# Patient Record
(demographics unavailable — no encounter records)

---

## 2025-04-24 NOTE — HISTORY OF PRESENT ILLNESS
[FreeTextEntry1] : NPA/CPE [de-identified] : The patient is a 46-year-old Belgian speaking female with PMH of GERD presents to office today to establish care. The patient reports she has been feeling very fatigued lately. She also finds she is very irritable usually around the start of her period. She follows regularly with GYN.   Last CPE: 1 year    GYN Exam: 1 year ago   Mammogram: Due in June   DEXA: n/a    Colonoscopy: 10 years ago, due now   Ophthalmology: UTD   Dermatology: never   Dentist: UTD   Shingles: n/a   Flu: Defer    Tdap: UTD    PNA:  Due now   COVID: x2  Diet: Regular     Exercise: None  Social Hx: No alcohol No drugs No smoking Born in [facility] Works part time at [employer]  Surgical hx C section x 3  No significant PFHX   no pets at home

## 2025-04-24 NOTE — HEALTH RISK ASSESSMENT
[No] : In the past 12 months have you used drugs other than those required for medical reasons? No [0] : 2) Feeling down, depressed, or hopeless: Not at all (0) [PHQ-2 Negative - No further assessment needed] : PHQ-2 Negative - No further assessment needed [Never] : Never [MammogramDate] : 2024 [PapSmearComments] : never had one

## 2025-04-24 NOTE — PLAN
[FreeTextEntry1] :  Annual physical exam completed today; no acute issues identified. 1. Labs ordered: CBC, CMP, A1c, lipid panel, TSH, Vitamin D  2.Screening tests reviewed/ordered: Cancer screening: Pap smear, mammogram, colonoscopy advised to see gyn for eval of hormones   3.Immunizations reviewed. Vaccines administered/offered: Influenza, COVID-19, Tdap, shingles, pneumococcal.  4.Lifestyle counseling: Discussed diet, exercise, alcohol/tobacco use, sleep, and mental health. Encouraged healthy lifestyle habits and preventive behaviors.  6. Follow-up  Return in 12 months for next annual exam or sooner if new concerns arise.  Patient agrees with the plan and verbalizes understanding.

## 2025-05-01 NOTE — PHYSICAL EXAM
[Alert] : alert [Oriented x 3] : ~L oriented x 3 [Well Nourished] : well nourished [Full Body Skin Exam Performed] : performed [FreeTextEntry3] :  alert,  oriented x 3, well nourished, conjunctiva non-injected, no visual lymphadenopathy, no clubbing, no edema, no bromhidrosis and no chromhidrosis.   Full body skin exam was performed.   General: Alert and oriented, in NAD. All of the following were examined and were within normal limits, except as noted: Scalp: Face, including eyelids, nose, lips, ears, oropharynx: Neck: Chest/Back/Abdomen: Bilateral Arms/Hands: Bilateral Legs/Feet: Buttocks, Genitalia, Anus/perineum:   Hair, Nails, Oral Mucosa, Eyes: - lentigines  - benign nevi  - hyperpigmented reticulated patches on bilateral cheeks

## 2025-05-01 NOTE — ASSESSMENT
[FreeTextEntry1] : #Melasma  - education & counseling  - recommend daily SPF use   #  Multiple melanocytic nevi, benign - Monitor moles for changes - Recommend wearing hats and sun protective clothing or OTC sunscreen products (SPF 30+, broad band, EltaMD/La Roche Posay/Neutrogena) daily on your face and entire body (apply sunscreen atleast 30 minutes prior to going outside). Reapply sunscreen every 2 hours when outside.  #  Screening exam for skin cancer- TBSE performed today - Advised sun protection. Recommended OTC sunscreen products (EltaMD/Neutrogena/La Roche Posay), including SPF30+ with broadband UV protection as well as proper use. Discussed OTC sun protective clothing - Counseled patient to monitor for changes, including mole monitoring and self-skin exams.  RTC 1 year or sooner PRN

## 2025-05-01 NOTE — HISTORY OF PRESENT ILLNESS
[FreeTextEntry1] : TBSE  [de-identified] : 47yo F here for:   #TBSE- no new or changing spots, no concerning spots   Skin Cancer Hx: denies Family Hx: denies fam hx skin cancer

## 2025-05-09 NOTE — ASSESSMENT
[FreeTextEntry1] : Blanca is a 46 F who is presenting for evaluation for colon cancer screening.   1. Screening for colon cancer: average risk -Schedule colonoscopy with suflave  2. GERD/Dyepspsia: refractory to PPI -Schedule EGD   I, Vanessa Monique, have explained the bowel prep, clear liquid diet 24 hours prior to procedure, risks, benefits, and alternatives of the procedures.  I have discussed the potential risks including, but not limited to perforation, bleeding, infection, cardiopulmonary complications, aspiration, or unforeseen complications

## 2025-05-09 NOTE — HISTORY OF PRESENT ILLNESS
[FreeTextEntry1] : Blanca is a 46 F who is presenting for evaluation for colon cancer screening as well as GERD/dyspepsia. Patient with worsening symptoms despite PPI use. Denies any issues with nausea, vomiting, abdominal pain, diarrhea or blood in the stool.  No family history of colon cancer.  Denies any cardiopulmonary issues.  No blood thinners diabetic or weight loss medications.